# Patient Record
Sex: MALE | Race: WHITE
[De-identification: names, ages, dates, MRNs, and addresses within clinical notes are randomized per-mention and may not be internally consistent; named-entity substitution may affect disease eponyms.]

---

## 2023-05-18 ENCOUNTER — HOSPITAL ENCOUNTER (EMERGENCY)
Dept: HOSPITAL 76 - ED | Age: 38
Discharge: HOME | End: 2023-05-18
Payer: COMMERCIAL

## 2023-05-18 VITALS — DIASTOLIC BLOOD PRESSURE: 92 MMHG | SYSTOLIC BLOOD PRESSURE: 149 MMHG

## 2023-05-18 DIAGNOSIS — S61.210A: Primary | ICD-10-CM
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
## 2023-05-18 PROCEDURE — 12001 RPR S/N/AX/GEN/TRNK 2.5CM/<: CPT

## 2023-05-18 PROCEDURE — 99281 EMR DPT VST MAYX REQ PHY/QHP: CPT

## 2023-05-18 NOTE — ED PHYSICIAN DOCUMENTATION
PD HPI UPPER EXT INJURY





- Stated complaint


Stated Complaint: R FINGER LAC





- Chief complaint


Chief Complaint: Ext Problem





- History obtained from


History obtained from: Patient





- History of Present Illness


Location: Right, Finger


Type of injury: Laceration (he was clearing grass from mower chute and cut tip 

of finger.)


Where injury occurred: Home


Timing - onset: Today


Timing - details: Abrupt onset


Improved by: No: Rest


Worsened by: Moving, Palpating


Associated symptoms: No: Weakness, Numbness


Similar symptoms before: Has not had sx before





Review of Systems


Skin: reports: Laceration (s)


Neurologic: denies: Focal weakness, Numbness





PD PAST MEDICAL HISTORY





- Past Medical History


Past Medical History: No





- Present Medications


Home Medications: 


                                Ambulatory Orders











 Medication  Instructions  Recorded  Confirmed


 


No Known Home Medications  05/18/23 05/18/23














- Allergies


Allergies/Adverse Reactions: 


                                    Allergies











Allergy/AdvReac Type Severity Reaction Status Date / Time


 


No Known Drug Allergies Allergy   Verified 05/18/23 18:19














PD ED PE NORMAL





- Vitals


Vital signs reviewed: Yes





- General


General: Alert and oriented X 3, No acute distress, Well developed/nourished





- Derm


Derm: Normal color, Warm and dry





- Extremities


Extremities: Other (The right index fingertip has a horizontal laceration distal

to the nailbed measuring 1.5 cm down to the fatty tissue with mild bleeding but 

no foreign body.It does not go down to bone.)





- Neuro


Neuro: Alert and oriented X 3, No motor deficit, No sensory deficit





Results





- Vitals


Vitals: 


                               Vital Signs - 24 hr











  05/18/23





  18:14


 


Temperature 36.7 C


 


Heart Rate 76


 


Respiratory 18





Rate 


 


Blood Pressure 149/92 H


 


O2 Saturation 100








                                     Oxygen











O2 Source                      Room air

















Procedures





- Laceration (location)


  ** right index inger tip


Length in cm: 1.5


Wound type: Irregular, Into subcut fat, Clean


Neurovascular status: Sensory intact, Motor intact


Wound preparation: Irrigated copiously NS (by ED tech), Wound explored, To the 

base


Skin layer closure: Nylon, Interrupted, Size #-0 - enter number (4), Sutures - 

enter # (4)


Other: Patient tolerated well, No complications, Tetanus UTD





PD Medical Decision Making





- ED course


Complexity details: considered differential, d/w patient, d/w family (spouse)





Departure





- Departure


Disposition: 01 Home, Self Care


Clinical Impression: 


 Finger laceration





Condition: Stable


Record reviewed to determine appropriate education?: Yes


Instructions:  ED Laceration Hand


Comments: 


It is okay to wash and shower. Clean off the wound twice a day with soap and 

water, or peroxide and water. Apply some antibiotic ointment to it to keep it 

moist. Also to watch for signs of infection such as purulence, redness or 

increasing pain. Return to your primary care or the ER at the specified time for

suture removal.





Suture removal 10 the 12 days.





Tylenol ibuprofen as needed for pains.


Activity as tolerated based on comfort.


Discharge Date/Time: 05/18/23 19:08
To get better and follow your care plan as instructed.